# Patient Record
(demographics unavailable — no encounter records)

---

## 2024-12-03 NOTE — ASSESSMENT
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 As this may be caused by significant LPR I would like to start him on omeprazole 40mg daily and famotidine 40mg daily. The risks, benefits, and alternatives to care were discussed with the patient and understanding expressed. I have also recommended voice therapy. May need surgery in future.

## 2024-12-03 NOTE — HISTORY OF PRESENT ILLNESS
[de-identified] :  RAMIRO BAPTISTE is a 54 year old man who presents to the [facility] with difficulty phonating.  He is referred by PCP Dr. Edd Ascencio Reports voice hoarseness for a year. States originally voice hoarseness was intermittent but now it is constant. No surgeries or illness when this began. No hx of smoking or throat cancer  No recent URI's  Has occasional nasal congestion and frequent throat clearing- using flonase PRN  Denies reflux symptoms.  Patient denies dysphagia, odynophagia, dyspnea, globus sensation. complete loss of voice, pain while speaking, sob when speaking, hemoptysis, cough, neck swelling, fevers/chills, unintentional weight loss, or throat infections and throat surgeries

## 2024-12-03 NOTE — REASON FOR VISIT
[[employer] ] : provided by [employer]   [Interpreters_IDNumber] : [id number] [Interpreters_FullName] : Costa  [TWNoteComboBox1] : Jenni

## 2024-12-03 NOTE — CONSULT LETTER
[Dear  ___] : Dear  [unfilled], [Consult Letter:] : I had the pleasure of evaluating your patient, [unfilled]. [Please see my note below.] : Please see my note below. [Consult Closing:] : Thank you very much for allowing me to participate in the care of this patient.  If you have any questions, please do not hesitate to contact me. [Sincerely,] : Sincerely, [FreeTextEntry2] : Edd Ascencio MD  [FreeTextEntry3] : Jermaine Ferguson M.D.   Division of Laryngology | Department of Otolaryngology   [place] [id number] Jermaine Ferguson M.D.   Division of Laryngology | Department of Otolaryngology   [place] [phone]

## 2024-12-03 NOTE — CONSULT LETTER
[Dear  ___] : Dear  [unfilled], [Consult Letter:] : I had the pleasure of evaluating your patient, [unfilled]. [Please see my note below.] : Please see my note below. [Consult Closing:] : Thank you very much for allowing me to participate in the care of this patient.  If you have any questions, please do not hesitate to contact me. [Sincerely,] : Sincerely, [FreeTextEntry2] : Edd Ascencio MD  [FreeTextEntry3] : Jermaine Ferguson M.D.   Division of Laryngology | Department of Otolaryngology   [place] [phone] Jermaine Ferguson M.D.   Division of Laryngology | Department of Otolaryngology   [place] [phone]

## 2024-12-03 NOTE — PROCEDURE
[de-identified] : Stroboscopic Laryngoscopy Procedure Note: Indications: Assess laryngeal biomechanics and vocal fold oscillation. Description of Procedure: Informed consent was verbally obtained from the patient prior to the procedure. The patient was seated in the clinic chair. Topical anesthesia was achieved by first spraying the nasal cavities with 4% lidocaine and nasal decongestant. Findings: Supraglottis: no masses or lesions Glottis:  Vocal cords:                       Right: hemorrhage throughout, small mid fold polyp, small to moderate anterior glottic web                       Left:  crisp and shows no lesions or masses                Mobility:                       Right:  normal                       Left:  normal                Amplitude:                       Right:  normal                       Left:  normal                Closure: complete                Wave symmetry:  asymmetric Subglottis: no masses or lesions within the visualized subglottis. Visualized airway is widely patent.

## 2024-12-03 NOTE — HISTORY OF PRESENT ILLNESS
[de-identified] :  RAMIRO BAPTISTE is a 54 year old man who presents to the [facility] with difficulty phonating.  He is referred by PCP Dr. Edd Ascencio Reports voice hoarseness for a year. States originally voice hoarseness was intermittent but now it is constant. No surgeries or illness when this began. No hx of smoking or throat cancer  No recent URI's  Has occasional nasal congestion and frequent throat clearing- using flonase PRN  Denies reflux symptoms.  Patient denies dysphagia, odynophagia, dyspnea, globus sensation. complete loss of voice, pain while speaking, sob when speaking, hemoptysis, cough, neck swelling, fevers/chills, unintentional weight loss, or throat infections and throat surgeries

## 2024-12-03 NOTE — PROCEDURE
[de-identified] : Stroboscopic Laryngoscopy Procedure Note: Indications: Assess laryngeal biomechanics and vocal fold oscillation. Description of Procedure: Informed consent was verbally obtained from the patient prior to the procedure. The patient was seated in the clinic chair. Topical anesthesia was achieved by first spraying the nasal cavities with 4% lidocaine and nasal decongestant. Findings: Supraglottis: no masses or lesions Glottis:  Vocal cords:                       Right: hemorrhage throughout, small mid fold polyp, small to moderate anterior glottic web                       Left:  crisp and shows no lesions or masses                Mobility:                       Right:  normal                       Left:  normal                Amplitude:                       Right:  normal                       Left:  normal                Closure: complete                Wave symmetry:  asymmetric Subglottis: no masses or lesions within the visualized subglottis. Visualized airway is widely patent.

## 2024-12-03 NOTE — REASON FOR VISIT
[[employer] ] : provided by [employer]   [Interpreters_IDNumber] : [id number] [Interpreters_FullName] : Costa  [TWNoteComboBox1] : Jenni

## 2025-01-16 NOTE — ASSESSMENT
[FreeTextEntry1] : CLINICAL VOICE EVALUATION REPORT   Date of Report: 2025  Date of Evaluation: 2025     Patient Name: Tashi Collins   :  2/15/1970 C.A.: 54   Primary Diagnosis: Dysphonia    Treatment Diagnosis: Dysphonia  Referring Physician: Dr. Jermaine Ferguson    Procedure Codes: Voice Evaluation - 55616                               Laryngeal Function - 77971      Pertinent Background Information:   Tashi Collins is a 54  year old male who was referred by his physician, Dr. Jermaine Ferguson, for a clinical voice evaluation due to findings of muscle tension dysphonia, R VF Polyp, and anterior glottic web.    History of Present Illness:   Mr. Collins arrived to today's evaluation unaccompanied and was a reliable informant.   The patient was alert, cooperative, and in no apparent distress. [employer]  ID #[id number] provided translation to [facility] throughout assessment. The patient reports persistent dysphonia marked by intermittent difficulty phonating, vocal disturbance, and feels communication partners having difficulty understanding him for the last 1-2 years.  The patient was most recently seen by referring physician Dr. Ferguson on 24 with findings of muscle tension dysphonia and R VF Polyp, and anterior glottic web and recommendations for voice therapy (please see EMR for details).  The patient denies history of reflux, however reports new medication provided by Dr. Ferguson has been improving vocal concerns and symptoms. He denies history of tobacco use and occasionally drinks alcohol. The patient denies odynophagia and dysphagia.      MEDICAL HISTORY, per EMR:  Active Problems  Lumbar radiculopathy (724.4) (M54.16)  Nocturia (788.43) (R35.1)   Past Medical History  History of hypertension (V12.59) (Z86.79)  No pertinent past medical history  History of No pertinent past surgical history   Surgical History  No history of surgery    CLINICAL FINDINGS:   Perceptual Voice Analysis:  Vocal Quality: Strained, Hoarse   Severity: Mild   Loudness Level: Low  Musculoskeletal Tension: Present  Respiration: Thoracic   Resonance: WNL    Tone Focus: Back tone  Type of Onset:  occasional hard glottal attack   Laryngeal Palpation: WFL    Acoustic Analysis  The [employer] IV 3950 was utilized to obtain baseline objective vocal function data.  The following information was obtained:   Sustained Phonation:   Fundamental Frequency =166.96 Hz.  (Normal 118 Hz.)   Frequency Range = 10.78 Hz (Normal <20 Hz.)   Habitual Intensity = 64.52 dB. (Normal 65-70 dB.)   Frequency Perturbation = 0.738 % (Normal <1%)   Amplitude Perturbation =2.97% (Normal <.33%.)   Percentage voiced: 100% (Normal 100%).   Conversational Speech:   Fundamental Frequency = 177.83 Hz. (Normal 18 Hz.)   Intensity =55.29 dB.  (Normal 65-70 dB)    Maximum Phonation Time: 12.2 seconds (Normal 25.89 seconds)     Interpretation:  Perceptually, Mr. Collins presented with a mild dysphonia characterized by a hoarse and strained vocal quality with intermittent hard glottal attack and subsequent use of glottal sethi. He demonstrated amanda laryngeal muscle tension and reduced respiratory- phonatory coordination was noted at the conversational level. Resonance was noted to be WFL. Acoustic measures indicated increased fundamental frequency, adequate frequency range, adequate habitual intensity, adequate frequency perturbation, and increased amplitude perturbation. In addition, increased fundamental frequency and reduced intensity were further measured during conversational speech. The patient presented with reduced max phonation time when compared to males within his age range.    Voice Handicap Index -10 (VHI-10):       IMPRESSION: Mild-moderate  Dysphonia    PROGNOSIS: Good with therapeutic intervention and adherence with HEP   RECOMMENDATIONS:  Based upon the evaluation results it is recommended that the patient be enrolled in a course of outpatient voice therapy to address the above areas of concern and assess if improvement in overall phonatory functioning can be achieved.       1. Voice therapy (CPT - 25308) is recommended 1 time a week for 6-8 weeks to improve overall vocal function. This recommendation was discussed and agreed with the patient.  2. Follow up with referring physician, as directed   EDUCATION: Counseling and patient education were completed on vocal hygiene and voice conservation at the end of today's assessment. The patient was also introduced to relaxation exercises and diaphragmatic breathing exercises.     PLAN OF CARE:   Long Term Goals:  1. The patient will implement generalization of goals with 80% accuracy independently to encourage use of new vocal skills in varied speaking contexts.   Short Term Goals:  1. The patient will reduce vocal tension/strain by decreasing upper musculoskeletal tension via relaxation exercises and laryngeal massage technique with 100% accuracy independently.  2. The patient will establish volitional control of respiration evidenced by use of diaphragmatic breathing in structured tasks with 90% accuracy with minimal verbal cues.  3. The patient will apply learned techniques (i.e. easy onset, frontal focus) yielding healthy vocal cord adduction and improved vocal quality in structured tasks with 80% accuracy with minimal verbal cues.    Should you have any additional concerns, please contact the Center at [phone].     Brittany Hogue M.A. CCC-SLP  Speech Language Pathologist  [facility]

## 2025-02-20 NOTE — REASON FOR VISIT
[[employer] ] : provided by [employer]   [Source: ______] : History obtained from [unfilled] [Interpreters_IDNumber] : [id number] [Interpreters_FullName] : Diallo  [TWNoteComboBox1] : Jenni

## 2025-02-20 NOTE — PROCEDURE
[de-identified] : Stroboscopic Laryngoscopy Procedure Note: Indications: Assess laryngeal biomechanics and vocal fold oscillation. Description of Procedure: Informed consent was verbally obtained from the patient prior to the procedure. The patient was seated in the clinic chair. Topical anesthesia was achieved by first spraying the nasal cavities with 4% lidocaine and nasal decongestant. Findings: Supraglottis: no masses or lesions Glottis: Vocal cords:  Right: crisp and shows no lesions or masses, small anterior glottic web  Left: crisp and shows no lesions or masses  Mobility:  Right: normal  Left: normal  Amplitude:  Right: normal  Left: normal  Closure: complete  Wave symmetry: mildly asymmetric Subglottis: no masses or lesions within the visualized subglottis. Visualized airway is widely patent.

## 2025-02-20 NOTE — REASON FOR VISIT
[[employer] ] : provided by [employer]   [Source: ______] : History obtained from [unfilled] [Interpreters_IDNumber] : [id number] [Interpreters_FullName] : Diallo  [TWNoteComboBox1] : Jenni

## 2025-02-20 NOTE — PROCEDURE
[de-identified] : Stroboscopic Laryngoscopy Procedure Note: Indications: Assess laryngeal biomechanics and vocal fold oscillation. Description of Procedure: Informed consent was verbally obtained from the patient prior to the procedure. The patient was seated in the clinic chair. Topical anesthesia was achieved by first spraying the nasal cavities with 4% lidocaine and nasal decongestant. Findings: Supraglottis: no masses or lesions Glottis: Vocal cords:  Right: crisp and shows no lesions or masses, small anterior glottic web  Left: crisp and shows no lesions or masses  Mobility:  Right: normal  Left: normal  Amplitude:  Right: normal  Left: normal  Closure: complete  Wave symmetry: mildly asymmetric Subglottis: no masses or lesions within the visualized subglottis. Visualized airway is widely patent.

## 2025-02-20 NOTE — HISTORY OF PRESENT ILLNESS
[de-identified] : RAMIRO BAPTISTE is a 54 year old man who presents to the [facility] with  muscle tension dysphonia, right vocal fold polyp, and anterior glottic web. Last seen 12/3/24. We began reflux meds BID and voice therapy. Was to follow up in 2mths to re-evaluate web and may need surgery in future for polyp. Recently started voice therapy. States he has been taking Famotidine 40mg daily with great relief  States voice hoarseness and throat clearing has improved since the last visit  Patient denies dysphagia, odynophagia, dyspnea, globus sensation. complete loss of voice, pain while speaking, sob when speaking, hemoptysis, cough, neck swelling, fevers/chills, unintentional weight loss, or throat infections.   Previously reported: difficulty phonating. He is referred by PCP Dr. Edd Ascencio Reports voice hoarseness for a year. States originally voice hoarseness was intermittent but now it is constant. No surgeries or illness when this began. No hx of smoking or throat cancer No recent URI's Has occasional nasal congestion and frequent throat clearing- using flonase PRN Denies reflux symptoms. Patient denies dysphagia, odynophagia, dyspnea, globus sensation. complete loss of voice, pain while speaking, sob when speaking, hemoptysis, cough, neck swelling, fevers/chills, unintentional weight loss, or throat infections and throat surgeries

## 2025-02-20 NOTE — ASSESSMENT
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  May discontinue reflux meds in 1 month.

## 2025-02-20 NOTE — HISTORY OF PRESENT ILLNESS
[de-identified] : RAMIRO BAPTISTE is a 54 year old man who presents to the [facility] with  muscle tension dysphonia, right vocal fold polyp, and anterior glottic web. Last seen 12/3/24. We began reflux meds BID and voice therapy. Was to follow up in 2mths to re-evaluate web and may need surgery in future for polyp. Recently started voice therapy. States he has been taking Famotidine 40mg daily with great relief  States voice hoarseness and throat clearing has improved since the last visit  Patient denies dysphagia, odynophagia, dyspnea, globus sensation. complete loss of voice, pain while speaking, sob when speaking, hemoptysis, cough, neck swelling, fevers/chills, unintentional weight loss, or throat infections.   Previously reported: difficulty phonating. He is referred by PCP Dr. Edd Ascencio Reports voice hoarseness for a year. States originally voice hoarseness was intermittent but now it is constant. No surgeries or illness when this began. No hx of smoking or throat cancer No recent URI's Has occasional nasal congestion and frequent throat clearing- using flonase PRN Denies reflux symptoms. Patient denies dysphagia, odynophagia, dyspnea, globus sensation. complete loss of voice, pain while speaking, sob when speaking, hemoptysis, cough, neck swelling, fevers/chills, unintentional weight loss, or throat infections and throat surgeries